# Patient Record
Sex: MALE | Race: BLACK OR AFRICAN AMERICAN | Employment: OTHER | URBAN - METROPOLITAN AREA
[De-identification: names, ages, dates, MRNs, and addresses within clinical notes are randomized per-mention and may not be internally consistent; named-entity substitution may affect disease eponyms.]

---

## 2018-04-30 ENCOUNTER — HOSPITAL ENCOUNTER (OUTPATIENT)
Age: 58
LOS: 1 days | Discharge: HOME | End: 2018-05-01
Attending: SPECIALIST | Admitting: SPECIALIST
Payer: OTHER GOVERNMENT

## 2018-04-30 ENCOUNTER — ANESTHESIA EVENT (OUTPATIENT)
Dept: SURGERY | Age: 58
End: 2018-04-30
Payer: OTHER GOVERNMENT

## 2018-04-30 ENCOUNTER — APPOINTMENT (OUTPATIENT)
Dept: GENERAL RADIOLOGY | Age: 58
End: 2018-04-30
Attending: SPECIALIST
Payer: OTHER GOVERNMENT

## 2018-04-30 ENCOUNTER — ANESTHESIA (OUTPATIENT)
Dept: SURGERY | Age: 58
End: 2018-04-30
Payer: OTHER GOVERNMENT

## 2018-04-30 DIAGNOSIS — M47.12 CERVICAL SPONDYLOSIS WITH MYELOPATHY: Primary | ICD-10-CM

## 2018-04-30 PROBLEM — Z98.1 S/P CERVICAL SPINAL FUSION: Status: ACTIVE | Noted: 2018-04-30

## 2018-04-30 LAB
ABO + RH BLD: NORMAL
BLOOD GROUP ANTIBODIES SERPL: NORMAL
SPECIMEN EXP DATE BLD: NORMAL

## 2018-04-30 PROCEDURE — 77030003666 HC NDL SPINAL BD -A: Performed by: SPECIALIST

## 2018-04-30 PROCEDURE — 86900 BLOOD TYPING SEROLOGIC ABO: CPT | Performed by: SPECIALIST

## 2018-04-30 PROCEDURE — 77030032490 HC SLV COMPR SCD KNE COVD -B: Performed by: SPECIALIST

## 2018-04-30 PROCEDURE — 77030020545: Performed by: SPECIALIST

## 2018-04-30 PROCEDURE — 77030013079 HC BLNKT BAIR HGGR 3M -A: Performed by: ANESTHESIOLOGY

## 2018-04-30 PROCEDURE — 77030002933 HC SUT MCRYL J&J -A: Performed by: SPECIALIST

## 2018-04-30 PROCEDURE — 97161 PT EVAL LOW COMPLEX 20 MIN: CPT

## 2018-04-30 PROCEDURE — 74011250636 HC RX REV CODE- 250/636

## 2018-04-30 PROCEDURE — 77030006643: Performed by: ANESTHESIOLOGY

## 2018-04-30 PROCEDURE — 77030014008 HC SPNG HEMSTAT J&J -C: Performed by: SPECIALIST

## 2018-04-30 PROCEDURE — 99218 HC RM OBSERVATION: CPT

## 2018-04-30 PROCEDURE — 76010000132 HC OR TIME 2.5 TO 3 HR: Performed by: SPECIALIST

## 2018-04-30 PROCEDURE — 77030027138 HC INCENT SPIROMETER -A

## 2018-04-30 PROCEDURE — 77030011640 HC PAD GRND REM COVD -A: Performed by: SPECIALIST

## 2018-04-30 PROCEDURE — 74011000272 HC RX REV CODE- 272: Performed by: SPECIALIST

## 2018-04-30 PROCEDURE — 77030002916 HC SUT ETHLN J&J -A: Performed by: SPECIALIST

## 2018-04-30 PROCEDURE — 77030011247 HC DRN WND KT TLS STRY -B: Performed by: SPECIALIST

## 2018-04-30 PROCEDURE — 77030004391 HC BUR FLUT MEDT -C: Performed by: SPECIALIST

## 2018-04-30 PROCEDURE — 77030003028 HC SUT VCRL J&J -A: Performed by: SPECIALIST

## 2018-04-30 PROCEDURE — 76060000036 HC ANESTHESIA 2.5 TO 3 HR: Performed by: SPECIALIST

## 2018-04-30 PROCEDURE — 74011250636 HC RX REV CODE- 250/636: Performed by: SPECIALIST

## 2018-04-30 PROCEDURE — C1713 ANCHOR/SCREW BN/BN,TIS/BN: HCPCS | Performed by: SPECIALIST

## 2018-04-30 PROCEDURE — 36415 COLL VENOUS BLD VENIPUNCTURE: CPT | Performed by: SPECIALIST

## 2018-04-30 PROCEDURE — 74011250637 HC RX REV CODE- 250/637: Performed by: SPECIALIST

## 2018-04-30 PROCEDURE — 74011000250 HC RX REV CODE- 250

## 2018-04-30 PROCEDURE — 74011000250 HC RX REV CODE- 250: Performed by: SPECIALIST

## 2018-04-30 PROCEDURE — 77030020782 HC GWN BAIR PAWS FLX 3M -B: Performed by: SPECIALIST

## 2018-04-30 PROCEDURE — 77030036596 HC SPCR CERV FORGE GLMB -G: Performed by: SPECIALIST

## 2018-04-30 PROCEDURE — 72020 X-RAY EXAM OF SPINE 1 VIEW: CPT

## 2018-04-30 PROCEDURE — 77030008477 HC STYL SATN SLP COVD -A: Performed by: ANESTHESIOLOGY

## 2018-04-30 PROCEDURE — 77030008683 HC TU ET CUF COVD -A: Performed by: ANESTHESIOLOGY

## 2018-04-30 PROCEDURE — 97116 GAIT TRAINING THERAPY: CPT

## 2018-04-30 PROCEDURE — 77030020407 HC IV BLD WRMR ST 3M -A: Performed by: ANESTHESIOLOGY

## 2018-04-30 PROCEDURE — 76210000006 HC OR PH I REC 0.5 TO 1 HR: Performed by: SPECIALIST

## 2018-04-30 PROCEDURE — 77030030163 HC BN WAX J&J -A: Performed by: SPECIALIST

## 2018-04-30 PROCEDURE — 77030018836 HC SOL IRR NACL ICUM -A: Performed by: SPECIALIST

## 2018-04-30 DEVICE — PROVIDENCE 4.2MM VARIABLE ANGLE SCREW, SELF-DRILLING, 16MM
Type: IMPLANTABLE DEVICE | Site: ANTERIOR CERVICAL | Status: FUNCTIONAL
Brand: PROVIDENCE

## 2018-04-30 DEVICE — IMPLANTABLE DEVICE: Type: IMPLANTABLE DEVICE | Site: ANTERIOR CERVICAL | Status: FUNCTIONAL

## 2018-04-30 DEVICE — PROVIDENCE 4.2MM VARIABLE ANGLE SCREW, SELF-DRILLING, 14MM
Type: IMPLANTABLE DEVICE | Site: ANTERIOR CERVICAL | Status: FUNCTIONAL
Brand: PROVIDENCE

## 2018-04-30 DEVICE — PROVIDENCE 4.6MM VARIABLE ANGLE SCREW, SELF-DRILLING, 14MM
Type: IMPLANTABLE DEVICE | Site: ANTERIOR CERVICAL | Status: FUNCTIONAL
Brand: PROVIDENCE

## 2018-04-30 DEVICE — PROVIDENCE ANTERIOR CERVICAL PLATE 2-LEVEL, 38MM
Type: IMPLANTABLE DEVICE | Site: ANTERIOR CERVICAL | Status: FUNCTIONAL
Brand: PROVIDENCE

## 2018-04-30 RX ORDER — DIPHENHYDRAMINE HCL 25 MG
25 CAPSULE ORAL
Status: DISCONTINUED | OUTPATIENT
Start: 2018-04-30 | End: 2018-05-01 | Stop reason: HOSPADM

## 2018-04-30 RX ORDER — HYDROMORPHONE HYDROCHLORIDE 2 MG/ML
INJECTION, SOLUTION INTRAMUSCULAR; INTRAVENOUS; SUBCUTANEOUS AS NEEDED
Status: DISCONTINUED | OUTPATIENT
Start: 2018-04-30 | End: 2018-04-30 | Stop reason: HOSPADM

## 2018-04-30 RX ORDER — MIDAZOLAM HYDROCHLORIDE 1 MG/ML
INJECTION, SOLUTION INTRAMUSCULAR; INTRAVENOUS AS NEEDED
Status: DISCONTINUED | OUTPATIENT
Start: 2018-04-30 | End: 2018-04-30 | Stop reason: HOSPADM

## 2018-04-30 RX ORDER — LIDOCAINE HYDROCHLORIDE 20 MG/ML
INJECTION, SOLUTION EPIDURAL; INFILTRATION; INTRACAUDAL; PERINEURAL AS NEEDED
Status: DISCONTINUED | OUTPATIENT
Start: 2018-04-30 | End: 2018-04-30 | Stop reason: HOSPADM

## 2018-04-30 RX ORDER — FENTANYL CITRATE 50 UG/ML
50 INJECTION, SOLUTION INTRAMUSCULAR; INTRAVENOUS
Status: DISCONTINUED | OUTPATIENT
Start: 2018-04-30 | End: 2018-04-30 | Stop reason: HOSPADM

## 2018-04-30 RX ORDER — FENTANYL CITRATE 50 UG/ML
INJECTION, SOLUTION INTRAMUSCULAR; INTRAVENOUS AS NEEDED
Status: DISCONTINUED | OUTPATIENT
Start: 2018-04-30 | End: 2018-04-30 | Stop reason: HOSPADM

## 2018-04-30 RX ORDER — DOCUSATE SODIUM 100 MG/1
100 CAPSULE, LIQUID FILLED ORAL 2 TIMES DAILY
Status: DISCONTINUED | OUTPATIENT
Start: 2018-04-30 | End: 2018-05-01 | Stop reason: HOSPADM

## 2018-04-30 RX ORDER — PROPOFOL 10 MG/ML
INJECTION, EMULSION INTRAVENOUS AS NEEDED
Status: DISCONTINUED | OUTPATIENT
Start: 2018-04-30 | End: 2018-04-30 | Stop reason: HOSPADM

## 2018-04-30 RX ORDER — SODIUM CHLORIDE 0.9 % (FLUSH) 0.9 %
5-10 SYRINGE (ML) INJECTION EVERY 8 HOURS
Status: DISCONTINUED | OUTPATIENT
Start: 2018-04-30 | End: 2018-05-01 | Stop reason: HOSPADM

## 2018-04-30 RX ORDER — SODIUM CHLORIDE, SODIUM LACTATE, POTASSIUM CHLORIDE, CALCIUM CHLORIDE 600; 310; 30; 20 MG/100ML; MG/100ML; MG/100ML; MG/100ML
75 INJECTION, SOLUTION INTRAVENOUS CONTINUOUS
Status: DISCONTINUED | OUTPATIENT
Start: 2018-04-30 | End: 2018-05-01 | Stop reason: HOSPADM

## 2018-04-30 RX ORDER — FLUMAZENIL 0.1 MG/ML
0.2 INJECTION INTRAVENOUS
Status: DISCONTINUED | OUTPATIENT
Start: 2018-04-30 | End: 2018-04-30 | Stop reason: HOSPADM

## 2018-04-30 RX ORDER — GLYCOPYRROLATE 0.2 MG/ML
INJECTION INTRAMUSCULAR; INTRAVENOUS AS NEEDED
Status: DISCONTINUED | OUTPATIENT
Start: 2018-04-30 | End: 2018-04-30 | Stop reason: HOSPADM

## 2018-04-30 RX ORDER — DEXAMETHASONE SODIUM PHOSPHATE 4 MG/ML
4 INJECTION, SOLUTION INTRA-ARTICULAR; INTRALESIONAL; INTRAMUSCULAR; INTRAVENOUS; SOFT TISSUE EVERY 8 HOURS
Status: COMPLETED | OUTPATIENT
Start: 2018-04-30 | End: 2018-05-01

## 2018-04-30 RX ORDER — ROCURONIUM BROMIDE 10 MG/ML
INJECTION, SOLUTION INTRAVENOUS AS NEEDED
Status: DISCONTINUED | OUTPATIENT
Start: 2018-04-30 | End: 2018-04-30 | Stop reason: HOSPADM

## 2018-04-30 RX ORDER — DEXAMETHASONE SODIUM PHOSPHATE 4 MG/ML
INJECTION, SOLUTION INTRA-ARTICULAR; INTRALESIONAL; INTRAMUSCULAR; INTRAVENOUS; SOFT TISSUE AS NEEDED
Status: DISCONTINUED | OUTPATIENT
Start: 2018-04-30 | End: 2018-04-30 | Stop reason: HOSPADM

## 2018-04-30 RX ORDER — PROPRANOLOL HYDROCHLORIDE 60 MG/1
120 CAPSULE, EXTENDED RELEASE ORAL DAILY
Status: DISCONTINUED | OUTPATIENT
Start: 2018-05-01 | End: 2018-05-01 | Stop reason: HOSPADM

## 2018-04-30 RX ORDER — ONDANSETRON 2 MG/ML
4 INJECTION INTRAMUSCULAR; INTRAVENOUS ONCE
Status: DISCONTINUED | OUTPATIENT
Start: 2018-04-30 | End: 2018-04-30 | Stop reason: HOSPADM

## 2018-04-30 RX ORDER — CEFAZOLIN SODIUM/WATER 2 G/20 ML
2 SYRINGE (ML) INTRAVENOUS EVERY 8 HOURS
Status: COMPLETED | OUTPATIENT
Start: 2018-04-30 | End: 2018-05-01

## 2018-04-30 RX ORDER — AMLODIPINE BESYLATE 2.5 MG/1
10 TABLET ORAL DAILY
Status: DISCONTINUED | OUTPATIENT
Start: 2018-05-01 | End: 2018-05-01 | Stop reason: HOSPADM

## 2018-04-30 RX ORDER — CEFAZOLIN SODIUM/WATER 2 G/20 ML
2 SYRINGE (ML) INTRAVENOUS ONCE
Status: COMPLETED | OUTPATIENT
Start: 2018-04-30 | End: 2018-04-30

## 2018-04-30 RX ORDER — SODIUM CHLORIDE, SODIUM LACTATE, POTASSIUM CHLORIDE, CALCIUM CHLORIDE 600; 310; 30; 20 MG/100ML; MG/100ML; MG/100ML; MG/100ML
125 INJECTION, SOLUTION INTRAVENOUS CONTINUOUS
Status: DISCONTINUED | OUTPATIENT
Start: 2018-04-30 | End: 2018-05-01 | Stop reason: HOSPADM

## 2018-04-30 RX ORDER — ONDANSETRON 2 MG/ML
INJECTION INTRAMUSCULAR; INTRAVENOUS AS NEEDED
Status: DISCONTINUED | OUTPATIENT
Start: 2018-04-30 | End: 2018-04-30 | Stop reason: HOSPADM

## 2018-04-30 RX ORDER — SODIUM CHLORIDE, SODIUM LACTATE, POTASSIUM CHLORIDE, CALCIUM CHLORIDE 600; 310; 30; 20 MG/100ML; MG/100ML; MG/100ML; MG/100ML
100 INJECTION, SOLUTION INTRAVENOUS CONTINUOUS
Status: DISCONTINUED | OUTPATIENT
Start: 2018-04-30 | End: 2018-04-30 | Stop reason: HOSPADM

## 2018-04-30 RX ORDER — NALOXONE HYDROCHLORIDE 0.4 MG/ML
0.4 INJECTION, SOLUTION INTRAMUSCULAR; INTRAVENOUS; SUBCUTANEOUS AS NEEDED
Status: DISCONTINUED | OUTPATIENT
Start: 2018-04-30 | End: 2018-04-30 | Stop reason: HOSPADM

## 2018-04-30 RX ORDER — HYDROMORPHONE HYDROCHLORIDE 2 MG/ML
0.5 INJECTION, SOLUTION INTRAMUSCULAR; INTRAVENOUS; SUBCUTANEOUS
Status: DISCONTINUED | OUTPATIENT
Start: 2018-04-30 | End: 2018-04-30 | Stop reason: HOSPADM

## 2018-04-30 RX ORDER — NALOXONE HYDROCHLORIDE 0.4 MG/ML
0.4 INJECTION, SOLUTION INTRAMUSCULAR; INTRAVENOUS; SUBCUTANEOUS AS NEEDED
Status: DISCONTINUED | OUTPATIENT
Start: 2018-04-30 | End: 2018-05-01 | Stop reason: HOSPADM

## 2018-04-30 RX ORDER — SODIUM CHLORIDE 0.9 % (FLUSH) 0.9 %
5-10 SYRINGE (ML) INJECTION AS NEEDED
Status: DISCONTINUED | OUTPATIENT
Start: 2018-04-30 | End: 2018-05-01 | Stop reason: HOSPADM

## 2018-04-30 RX ORDER — ONDANSETRON 2 MG/ML
4 INJECTION INTRAMUSCULAR; INTRAVENOUS
Status: DISCONTINUED | OUTPATIENT
Start: 2018-04-30 | End: 2018-05-01 | Stop reason: HOSPADM

## 2018-04-30 RX ORDER — OXYCODONE AND ACETAMINOPHEN 5; 325 MG/1; MG/1
1 TABLET ORAL
Status: DISCONTINUED | OUTPATIENT
Start: 2018-04-30 | End: 2018-05-01 | Stop reason: HOSPADM

## 2018-04-30 RX ORDER — LORATADINE 10 MG/1
10 TABLET ORAL DAILY
Status: DISCONTINUED | OUTPATIENT
Start: 2018-05-01 | End: 2018-05-01 | Stop reason: HOSPADM

## 2018-04-30 RX ADMIN — Medication 2 G: at 17:15

## 2018-04-30 RX ADMIN — DEXAMETHASONE SODIUM PHOSPHATE 8 MG: 4 INJECTION, SOLUTION INTRA-ARTICULAR; INTRALESIONAL; INTRAMUSCULAR; INTRAVENOUS; SOFT TISSUE at 10:38

## 2018-04-30 RX ADMIN — SODIUM CHLORIDE, SODIUM LACTATE, POTASSIUM CHLORIDE, AND CALCIUM CHLORIDE 125 ML/HR: 600; 310; 30; 20 INJECTION, SOLUTION INTRAVENOUS at 15:22

## 2018-04-30 RX ADMIN — DOCUSATE SODIUM 100 MG: 100 CAPSULE, LIQUID FILLED ORAL at 22:34

## 2018-04-30 RX ADMIN — LIDOCAINE HYDROCHLORIDE 40 MG: 20 INJECTION, SOLUTION EPIDURAL; INFILTRATION; INTRACAUDAL; PERINEURAL at 10:26

## 2018-04-30 RX ADMIN — SODIUM CHLORIDE, SODIUM LACTATE, POTASSIUM CHLORIDE, AND CALCIUM CHLORIDE: 600; 310; 30; 20 INJECTION, SOLUTION INTRAVENOUS at 12:15

## 2018-04-30 RX ADMIN — ROCURONIUM BROMIDE 10 MG: 10 INJECTION, SOLUTION INTRAVENOUS at 11:08

## 2018-04-30 RX ADMIN — Medication 2 G: at 10:37

## 2018-04-30 RX ADMIN — MIDAZOLAM HYDROCHLORIDE 2 MG: 1 INJECTION, SOLUTION INTRAMUSCULAR; INTRAVENOUS at 10:18

## 2018-04-30 RX ADMIN — ONDANSETRON 4 MG: 2 INJECTION INTRAMUSCULAR; INTRAVENOUS at 10:41

## 2018-04-30 RX ADMIN — SODIUM CHLORIDE, SODIUM LACTATE, POTASSIUM CHLORIDE, AND CALCIUM CHLORIDE 75 ML/HR: 600; 310; 30; 20 INJECTION, SOLUTION INTRAVENOUS at 18:17

## 2018-04-30 RX ADMIN — HYDROMORPHONE HYDROCHLORIDE 1 MG: 2 INJECTION, SOLUTION INTRAMUSCULAR; INTRAVENOUS; SUBCUTANEOUS at 12:45

## 2018-04-30 RX ADMIN — HYDROMORPHONE HYDROCHLORIDE 1 MG: 2 INJECTION, SOLUTION INTRAMUSCULAR; INTRAVENOUS; SUBCUTANEOUS at 11:06

## 2018-04-30 RX ADMIN — ROCURONIUM BROMIDE 50 MG: 10 INJECTION, SOLUTION INTRAVENOUS at 10:26

## 2018-04-30 RX ADMIN — SODIUM CHLORIDE, SODIUM LACTATE, POTASSIUM CHLORIDE, AND CALCIUM CHLORIDE 125 ML/HR: 600; 310; 30; 20 INJECTION, SOLUTION INTRAVENOUS at 13:50

## 2018-04-30 RX ADMIN — DEXAMETHASONE SODIUM PHOSPHATE 4 MG: 4 INJECTION, SOLUTION INTRAMUSCULAR; INTRAVENOUS at 18:10

## 2018-04-30 RX ADMIN — SODIUM CHLORIDE, SODIUM LACTATE, POTASSIUM CHLORIDE, AND CALCIUM CHLORIDE: 600; 310; 30; 20 INJECTION, SOLUTION INTRAVENOUS at 10:45

## 2018-04-30 RX ADMIN — PROPOFOL 200 MG: 10 INJECTION, EMULSION INTRAVENOUS at 10:26

## 2018-04-30 RX ADMIN — FENTANYL CITRATE 100 MCG: 50 INJECTION, SOLUTION INTRAMUSCULAR; INTRAVENOUS at 10:18

## 2018-04-30 RX ADMIN — SODIUM CHLORIDE, SODIUM LACTATE, POTASSIUM CHLORIDE, AND CALCIUM CHLORIDE 125 ML/HR: 600; 310; 30; 20 INJECTION, SOLUTION INTRAVENOUS at 09:07

## 2018-04-30 RX ADMIN — GLYCOPYRROLATE 0.2 MG: 0.2 INJECTION INTRAMUSCULAR; INTRAVENOUS at 10:24

## 2018-04-30 RX ADMIN — Medication: at 13:31

## 2018-04-30 NOTE — PROGRESS NOTES
Problem: Falls - Risk of  Goal: *Absence of Falls  Document Ranjith Fall Risk and appropriate interventions in the flowsheet.    Outcome: Progressing Towards Goal  Fall Risk Interventions:

## 2018-04-30 NOTE — ROUTINE PROCESS
TRANSFER - IN REPORT:    Verbal report received from [address] on Isauro Aguero  being received from PACU for routine post - op. Report consisted of patients Situation, Background, Assessment and   Recommendations(SBAR). Information from the following report(s) SBAR, Kardex, OR Summary, Intake/Output and MAR was reviewed with the receiving nurse. Opportunity for questions and clarification was provided. Assessment to be completed upon patients arrival to unit and care assumed.

## 2018-04-30 NOTE — IP AVS SNAPSHOT
[address] [address] 
[phone] Patient: Ava Coronel MRN: [id number] A check ari indicates which time of day the medication should be taken. My Medications START taking these medications Instructions Each Dose to Equal  
 Morning Noon Evening Bedtime  
 oxyCODONE-acetaminophen 5-325 mg per tablet Commonly known as:  PERCOCET Your last dose was: Your next dose is: Take 1 Tab by mouth every four (4) hours as needed for Pain. Max Daily Amount: 6 Tabs. 1 Tab ASK your doctor about these medications Instructions Each Dose to Equal  
 Morning Noon Evening Bedtime  
 amLODIPine 10 mg tablet Commonly known as:  Erenest Hansel Your last dose was: Your next dose is: Take 10 mg by mouth daily. 10 mg  
    
   
   
   
  
 cetirizine 10 mg tablet Commonly known as:  ZYRTEC Your last dose was: Your next dose is: Take  by mouth daily. Indications: Allergic Rhinitis MEGARED OMEGA-3 KRILL OIL 1,000-230-60 mg Cap Generic drug:  krill-om-3-dha-epa-phospho-ast  
   
Your last dose was: Your next dose is: Take  by mouth daily. MOTRIN PO Your last dose was: Your next dose is: Take 400 mg by mouth as needed. 400 mg  
    
   
   
   
  
 propranolol  mg SR capsule Commonly known as:  INDERAL LA Your last dose was: Your next dose is: Take 120 mg by mouth daily. Indications: hypertension 120 mg  
    
   
   
   
  
 VITAMIN D3 1,000 unit tablet Generic drug:  cholecalciferol Your last dose was: Your next dose is: Take  by mouth daily. Where to Get Your Medications Information on where to get these meds will be given to you by the nurse or doctor. ! Ask your nurse or doctor about these medications  
  oxyCODONE-acetaminophen 5-325 mg per tablet

## 2018-04-30 NOTE — IP AVS SNAPSHOT
303 [facility] 
 
 
 [address] [place] [address] 
[phone] Patient: Laila Babcock MRN: [id number] About your hospitalization You were admitted on:  April 30, 2018 You last received care in the:  THE [facility] [address] You were discharged on:  May 1, 2018 Why you were hospitalized Your primary diagnosis was:  Not on File Your diagnoses also included:  Cervical Spondylosis With Myelopathy, S/P Cervical Spinal Fusion, Pain At Surgical Incision Follow-up Information Follow up With Details Comments Contact Info Nirav Marie MD On 5/24/2018 Follow up appointment @ 10:00am [address] [facility] 
[phone] Ruth Velez MD   [place] MANJIT RICHEY [address] [place] [address] 
[phone] Discharge Orders None A check ari indicates which time of day the medication should be taken. My Medications START taking these medications Instructions Each Dose to Equal  
 Morning Noon Evening Bedtime  
 oxyCODONE-acetaminophen 5-325 mg per tablet Commonly known as:  PERCOCET Your last dose was: Your next dose is: Take 1 Tab by mouth every four (4) hours as needed for Pain. Max Daily Amount: 6 Tabs. 1 Tab ASK your doctor about these medications Instructions Each Dose to Equal  
 Morning Noon Evening Bedtime  
 amLODIPine 10 mg tablet Commonly known as:  Mary Sagar Your last dose was: Your next dose is: Take 10 mg by mouth daily. 10 mg  
    
   
   
   
  
 cetirizine 10 mg tablet Commonly known as:  ZYRTEC Your last dose was: Your next dose is: Take  by mouth daily. Indications: Allergic Rhinitis MEGARED OMEGA-3 KRILL OIL 1,000-230-60 mg Cap Generic drug:  krill-om-3-dha-epa-phospho-ast  
   
Your last dose was: Your next dose is: Take  by mouth daily. MOTRIN PO Your last dose was: Your next dose is: Take 400 mg by mouth as needed. 400 mg  
    
   
   
   
  
 propranolol  mg SR capsule Commonly known as:  INDERAL LA Your last dose was: Your next dose is: Take 120 mg by mouth daily. Indications: hypertension 120 mg  
    
   
   
   
  
 VITAMIN D3 1,000 unit tablet Generic drug:  cholecalciferol Your last dose was: Your next dose is: Take  by mouth daily. Where to Get Your Medications Information on where to get these meds will be given to you by the nurse or doctor. ! Ask your nurse or doctor about these medications  
  oxyCODONE-acetaminophen 5-325 mg per tablet Opioid Education Prescription Opioids: What You Need to Know: 
 
 
 
F-face looks uneven A-arms unable to move or move unevenly S-speech slurred or non-existent T-time-call 911 as soon as signs and symptoms begin-DO NOT go Back to bed or wait to see if you get better-TIME IS BRAIN. Warning Signs of HEART ATTACK Call 911 if you have these symptoms: 
? Chest discomfort. Most heart attacks involve discomfort in the center of the chest that lasts more than a few minutes, or that goes away and comes back. It can feel like uncomfortable pressure, squeezing, fullness, or pain. ? Discomfort in other areas of the upper body. Symptoms can include pain or discomfort in one or both arms, the back, neck, jaw, or stomach. ? Shortness of breath with or without chest discomfort. ? Other signs may include breaking out in a cold sweat, nausea, or lightheadedness. Don't wait more than five minutes to call [address]! Fast action can save your life. Calling 911 is almost always the fastest way to get lifesaving treatment. Emergency Medical Services staff can begin treatment when they arrive  up to an hour sooner than if someone gets to the hospital by car. The discharge information has been reviewed with the patient. The patient verbalized understanding. Discharge medications reviewed with the patient and appropriate educational materials and side effects teaching were provided. ___________________________________________________________________________________________________________________________________ Lipperheyhart Announcement We are excited to announce that we are making your provider's discharge notes available to you in Appetizer Mobile. You will see these notes when they are completed and signed by the physician that discharged you from your recent hospital stay. If you have any questions or concerns about any information you see in RumbleTalkt, please call the Health Information Department where you were seen or reach out to your Primary Care Provider for more information about your plan of care. Introducing [facility] & HEALTH SERVICES! [employer] introduces Appetizer Mobile patient portal. Now you can access parts of your medical record, email your doctor's office, and request medication refills online. 1. In your internet browser, go to [email or web address] 2. Click on the First Time User? Click Here link in the Sign In box. You will see the New Member Sign Up page. 3. Enter your Appetizer Mobile Access Code exactly as it appears below.  You will not need to use this code after youve completed the sign-up process. If you do not sign up before the expiration date, you must request a new code. · LoopMe Access Code: [email or web address] Expires: 7/23/2018 11:11 AM 
 
4. Enter the last four digits of your Social Security Number (xxxx) and Date of Birth (mm/dd/yyyy) as indicated and click Submit. You will be taken to the next sign-up page. 5. Create a LoopMe ID. This will be your LoopMe login ID and cannot be changed, so think of one that is secure and easy to remember. 6. Create a LoopMe password. You can change your password at any time. 7. Enter your Password Reset Question and Answer. This can be used at a later time if you forget your password. 8. Enter your e-mail address. You will receive e-mail notification when new information is available in [address] 9. Click Sign Up. You can now view and download portions of your medical record. 10. Click the Download Summary menu link to download a portable copy of your medical information. If you have questions, please visit the Frequently Asked Questions section of the LoopMe website. Remember, LoopMe is NOT to be used for urgent needs. For medical emergencies, dial 911. Now available from your iPhone and Android! Introducing Familia Reyes As a [facility] patient, I wanted to make you aware of our electronic visit tool called Familia Masonliudmilaruss. Dimitri [facility] 24/7 allows you to connect within minutes with a medical provider 24 hours a day, seven days a week via a mobile device or tablet or logging into a secure website from your computer. You can access Familia Reyes from anywhere in the [place].  
 
A virtual visit might be right for you when you have a simple condition and feel like you just dont want to get out of bed, or cant get away from work for an appointment, when your regular [facility] provider is not available (evenings, weekends or holidays), or when youre out of town and need minor care. Electronic visits cost only $49 and if the [employer] 24/7 provider determines a prescription is needed to treat your condition, one can be electronically transmitted to a nearby pharmacy*. Please take a moment to enroll today if you have not already done so. The enrollment process is free and takes just a few minutes. To enroll, please download the [employer] 24/7 matilda to your tablet or phone, or visit [email or web address] to enroll on your computer. And, as an [address] [place] [address] patient with a Near Page account, the results of your visits will be scanned into your electronic medical record and your primary care provider will be able to view the scanned results. We urge you to continue to see your regular [employer] provider for your ongoing medical care. And while your primary care provider may not be the one available when you seek a Wandrian virtual visit, the peace of mind you get from getting a real diagnosis real time can be priceless. For more information on Wandrian, view our Frequently Asked Questions (FAQs) at [email or web address]. Sincerely, 
 
Alice Macedo MD 
Chief Medical Officer [employer] *:  certain medications cannot be prescribed via Wandrian Unresulted Labs-Please follow up with your PCP about these lab tests Order Current Status [place] XR TECHNOLOGIST SERVICE In process Providers Seen During Your Hospitalization Provider Specialty Primary office phone Leeroy Ahuja MD Neurosurgery [phone] Your Primary Care Physician (PCP) Primary Care Physician Office Phone Office Fax Fay Hall [address] You are allergic to the following Allergen Reactions Gabapentin Swelling  
 feet Recent Documentation Height Weight BMI Smoking Status 1.727 m 74.2 kg 24.87 kg/m2 Former Smoker Emergency Contacts Name Discharge Info Relation Home Work Mobile Urmila Banks DISCHARGE CAREGIVER [3] Girlfriend [18]   [phone] Patient Belongings The following personal items are in your possession at time of discharge: 
     Visual Aid: Glasses, At bedside      Home Medications: None   Jewelry: None  Clothing: Footwear, Pants, Shirt, Socks, Undergarments (with spouse Minerva Duenas )    Other Valuables: Cell Phone, Eyeglasses (with wife ) Please provide this summary of care documentation to your next provider. Signatures-by signing, you are acknowledging that this After Visit Summary has been reviewed with you and you have received a copy. Patient Signature:  ____________________________________________________________ Date:  ____________________________________________________________  
  
Basilia Barbosa Provider Signature:  ____________________________________________________________ Date:  ____________________________________________________________

## 2018-04-30 NOTE — PERIOP NOTES
Verbal hand off at the bedside with [facility] provided opportunity for questions and dual skin assessment completed.

## 2018-04-30 NOTE — ANESTHESIA POSTPROCEDURE EVALUATION
Post-Anesthesia Evaluation and Assessment    Cardiovascular Function/Vital Signs  Visit Vitals    /74    Pulse (!) 54    Temp 36.7 °C (98 °F)    Resp 15    Ht 5' 8\" (1.727 m)    Wt 74.2 kg (163 lb 9 oz)    SpO2 100%    BMI 24.87 kg/m2       Patient is status post Procedure(s):  ANTERIOR CERVICAL DISKECTOMY AND FUSION (C3-C4,C4-C5) BONE AND PLATING W/C-ARM. Nausea/Vomiting: Controlled. Postoperative hydration reviewed and adequate. Pain:  Pain Scale 1: Numeric (0 - 10) (04/30/18 1345)  Pain Intensity 1: 0 (04/30/18 1345)   Managed. Neurological Status:   Neuro (WDL): Within Defined Limits (04/30/18 1304)   At baseline. Mental Status and Level of Consciousness: Baseline and stable. Pulmonary Status:   O2 Device: Nasal cannula (04/30/18 1320)   Adequate oxygenation and airway patent. Complications related to anesthesia: None    Post-anesthesia assessment completed. No concerns. Patient has met all discharge requirements.     Signed By: Stephon Estrada MD

## 2018-04-30 NOTE — PROGRESS NOTES
Problem: Mobility Impaired (Adult and Pediatric)  Goal: *Acute Goals and Plan of Care (Insert Text)  In 1-7 days pt will be able to perform:  STG  1. Bed mobility:  Demonstrate proper log-roll technique for safe initiation of rolling for OOB activities. 2.  Supine to sit to supine S with HR for meals. 3.  Sit to stand to sit S with cervical collar in prep for ambulation. LT.  Gait:  Ambulate 150ft S with cervical collar, for home/community mobility. 2.  Stair Negotiation:  Ascend/descend >2 steps CGA with HR for home entry. 3.  Activity tolerance: Tolerate up in chair 30 minutes-1 hour for ADLs. 4.  Patient/Family Education:  Patient/family to be independent with HEP for follow-up care and safe discharge. physical Therapy EVALUATION    Patient: Patience Miguel (89 y.o. male)  Date: 2018  Primary Diagnosis: CERVICAL SPONDYLOSIS  Cervical spondylosis with myelopathy  S/P cervical spinal fusion  Procedure(s) (LRB):  ANTERIOR CERVICAL DISKECTOMY AND FUSION (C3-C4,C4-C5) BONE AND PLATING W/C-ARM (N/A) Day of Surgery   Precautions:   Fall, Spinal    ASSESSMENT :  Based on the objective data described below, the patient presents with decreased functional mobility and independence in regard to bed mobility, transfers, gt quality and tolerance, pain, stair negotiation and safety due to recent cervical surgery. Pt rating pain in cervical region 6/10 on numerical pain scale. Pt instructed in cervical precautions and log roll technique. Pt able to participate in gt training w/ cervical collar, GB and SBA in hallway holding to IV pole. Pt returned to supine in bed w/ all needs within reach, SCDs applied. Nurse Lawanna Kanner aware and visitor present. Recommend [facility] upon hospital d/c. Patient will benefit from skilled intervention to address the above impairments.   Patients rehabilitation potential is considered to be Excellent  Factors which may influence rehabilitation potential include:   []         None noted  []         Mental ability/status  [x]         Medical condition  []         Home/family situation and support systems  []         Safety awareness  [x]         Pain tolerance/management  []         Other:      PLAN :  Recommendations and Planned Interventions:  [x]           Bed Mobility Training             []    Neuromuscular Re-Education  [x]           Transfer Training                   []    Orthotic/Prosthetic Training  [x]           Gait Training                          []    Modalities  []           Therapeutic Exercises          []    Edema Management/Control  [x]           Therapeutic Activities            [x]    Patient and Family Training/Education  []           Other (comment):    Frequency/Duration: Patient will be followed by physical therapy twice daily to address goals. Discharge Recommendations: Home Health  Further Equipment Recommendations for Discharge: N/A     SUBJECTIVE:   Patient stated I feel good enough to walk.     OBJECTIVE DATA SUMMARY:     Past Medical History:   Diagnosis Date    Allergic rhinitis     Arthritis     Hypertension 2011   History reviewed. No pertinent surgical history. Barriers to Learning/Limitations: None  Compensate with: visual, verbal, tactile, kinesthetic cues/model  Prior Level of Function/Home Situation:   Home Situation  Home Environment: Private residence  # Steps to Enter: 3  Rails to Enter: Yes  Hand Rails : Bilateral  One/Two Story Residence: One story  Living Alone: No  Support Systems: Spouse/Significant Other/Partner  Patient Expects to be Discharged to[de-identified] Private residence  Current DME Used/Available at Home: Brace/Splint  Critical Behavior:  Neurologic State: Alert; Appropriate for age  Orientation Level: Oriented X4  Cognition: Appropriate decision making; Appropriate for age attention/concentration; Appropriate safety awareness; Follows commands  Safety/Judgement: Awareness of environment  Psychosocial  Patient Behaviors: Calm;Cooperative  Purposeful Interaction: Yes  Pt Identified Daily Priority: Clinical issues (comment)  Caritas Process: Nurture loving kindness;Establish trust;Teaching/learning;Create healing environment  Caring Interventions: Reassure  Reassure: Therapeutic listening; Acceptance;Caring rounds  Skin Condition/Temp: Dry;Warm  Skin Integrity: Incision (comment) (cervical)  Skin Integumentary  Skin Color: Appropriate for ethnicity  Skin Condition/Temp: Dry;Warm  Skin Integrity: Incision (comment) (cervical)  Turgor: Non-tenting  Hair Growth: Present  Varicosities: Absent  Strength:    Strength: Generally decreased, functional  Tone & Sensation:   Tone: Normal  Sensation: Intact  Range Of Motion:  AROM: Generally decreased, functional  Functional Mobility:  Bed Mobility:  Rolling: Supervision  Supine to Sit: Supervision  Sit to Supine: Supervision  Scooting: Supervision  Transfers:  Sit to Stand: Supervision  Stand to Sit: Supervision  Balance:   Sitting: Intact  Standing: Intact  Ambulation/Gait Training:  Distance (ft): 150 Feet (ft)  Assistive Device: Gait belt;Brace/Splint  Ambulation - Level of Assistance: Stand-by assistance (vc)  Gait Abnormalities: Path deviations  Base of Support: Narrowed  Stance: Weight shift  Speed/Amairani: Pace decreased (<100 feet/min)  Step Length: Left shortened;Right shortened  Interventions: Safety awareness training; Tactile cues; Verbal cues  Therapeutic Exercises:   Pain:  Pain Scale 1: Numeric (0 - 10)  Pain Intensity 1: 6  Pain Location 1: Neck  Pain Orientation 1: Posterior  Pain Description 1: Tightness  Pain Intervention(s) 1: Encouraged PCA  Activity Tolerance:   Good   Please refer to the flowsheet for vital signs taken during this treatment.   After treatment:   []         Patient left in no apparent distress sitting up in chair  [x]         Patient left in no apparent distress in bed  [x]         Call bell left within reach  [x]         Nursing notified  [x]         Caregiver present  []         Bed alarm activated    COMMUNICATION/EDUCATION:   [x]         Fall prevention education was provided and the patient/caregiver indicated understanding. [x]         Patient/family have participated as able in goal setting and plan of care. [x]         Patient/family agree to work toward stated goals and plan of care. []         Patient understands intent and goals of therapy, but is neutral about his/her participation. []         Patient is unable to participate in goal setting and plan of care. Thank you for this referral.  Aníbal Son, PT   Time Calculation: 23 mins    Eval Complexity: History: MEDIUM  Complexity : 1-2 comorbidities / personal factors will impact the outcome/ POC Exam:MEDIUM Complexity : 3 Standardized tests and measures addressing body structure, function, activity limitation and / or participation in recreation  Presentation: LOW Complexity : Stable, uncomplicated  Clinical Decision Making:Low Complexity amb >30' Overall Complexity:LOW      Mobility  Current  CI= 1-19%   Goal  CI= 1-19%. The severity rating is based on the Level of Assistance required for Functional Mobility and ADLs.

## 2018-04-30 NOTE — ANESTHESIA PREPROCEDURE EVALUATION
Anesthetic History   No history of anesthetic complications            Review of Systems / Medical History  Patient summary reviewed, nursing notes reviewed and pertinent labs reviewed    Pulmonary              Pertinent negatives: No COPD, asthma, recent URI and sleep apnea  Comments: Former smoker   Neuro/Psych   Within defined limits           Cardiovascular    Hypertension: well controlled            Pertinent negatives: No past MI, CAD, PAD, dysrhythmias, angina and CHF  Exercise tolerance: >4 METS     GI/Hepatic/Renal  Within defined limits              Endo/Other        Arthritis  Pertinent negatives: No diabetes, hypothyroidism, hyperthyroidism, obesity and blood dyscrasia   Other Findings              Physical Exam    Airway  Mallampati: II  TM Distance: 4 - 6 cm  Neck ROM: decreased range of motion   Mouth opening: Normal     Cardiovascular  Regular rate and rhythm,  S1 and S2 normal,  no murmur, click, rub, or gallop             Dental  No notable dental hx       Pulmonary  Breath sounds clear to auscultation               Abdominal  GI exam deferred       Other Findings            Anesthetic Plan    ASA: 2  Anesthesia type: general          Induction: Intravenous  Anesthetic plan and risks discussed with: Patient and Family      GA/OETT with Martinez Gillespie

## 2018-04-30 NOTE — PERIOP NOTES
TRANSFER - OUT REPORT:    Verbal report given to Tam Donnelly RN(name) on Rk Christie  being transferred to [address] [place], [place] [address]unit) for routine progression of care       Report consisted of patients Situation, Background, Assessment and   Recommendations(SBAR). Information from the following report(s) SBAR, Kardex, OR Summary, Procedure Summary, MAR and Recent Results was reviewed with the receiving nurse. Lines:   Peripheral IV 04/30/18 Left Antecubital (Active)   Site Assessment Clean, dry, & intact 4/30/2018  1:04 PM   Phlebitis Assessment 0 4/30/2018  1:04 PM   Infiltration Assessment 0 4/30/2018  1:04 PM   Dressing Status Clean, dry, & intact 4/30/2018  1:04 PM   Dressing Type Tape;Transparent 4/30/2018  1:04 PM   Hub Color/Line Status Green; Infusing 4/30/2018  1:04 PM        Opportunity for questions and clarification was provided.       Patient transported with:   O2 @ 2 liters  Registered Nurse  [facility]

## 2018-05-01 VITALS
BODY MASS INDEX: 24.79 KG/M2 | HEIGHT: 68 IN | HEART RATE: 57 BPM | WEIGHT: 163.56 LBS | TEMPERATURE: 97.4 F | RESPIRATION RATE: 16 BRPM | OXYGEN SATURATION: 100 % | SYSTOLIC BLOOD PRESSURE: 142 MMHG | DIASTOLIC BLOOD PRESSURE: 75 MMHG

## 2018-05-01 PROBLEM — L76.82 PAIN AT SURGICAL INCISION: Status: ACTIVE | Noted: 2018-05-01

## 2018-05-01 PROCEDURE — 74011250636 HC RX REV CODE- 250/636: Performed by: SPECIALIST

## 2018-05-01 PROCEDURE — 74011250637 HC RX REV CODE- 250/637: Performed by: SPECIALIST

## 2018-05-01 PROCEDURE — 97116 GAIT TRAINING THERAPY: CPT

## 2018-05-01 RX ORDER — OXYCODONE AND ACETAMINOPHEN 5; 325 MG/1; MG/1
1 TABLET ORAL
Qty: 30 TAB | Refills: 0 | Status: SHIPPED | OUTPATIENT
Start: 2018-05-01

## 2018-05-01 RX ADMIN — LORATADINE 10 MG: 10 TABLET ORAL at 08:17

## 2018-05-01 RX ADMIN — PROPRANOLOL HYDROCHLORIDE 120 MG: 60 CAPSULE, EXTENDED RELEASE ORAL at 08:18

## 2018-05-01 RX ADMIN — Medication 10 ML: at 08:31

## 2018-05-01 RX ADMIN — Medication 2 G: at 08:17

## 2018-05-01 RX ADMIN — DEXAMETHASONE SODIUM PHOSPHATE 4 MG: 4 INJECTION, SOLUTION INTRAMUSCULAR; INTRAVENOUS at 00:03

## 2018-05-01 RX ADMIN — DOCUSATE SODIUM 100 MG: 100 CAPSULE, LIQUID FILLED ORAL at 08:17

## 2018-05-01 RX ADMIN — DEXAMETHASONE SODIUM PHOSPHATE 4 MG: 4 INJECTION, SOLUTION INTRAMUSCULAR; INTRAVENOUS at 08:17

## 2018-05-01 RX ADMIN — Medication 2 G: at 00:03

## 2018-05-01 NOTE — DISCHARGE INSTRUCTIONS
DISCHARGE SUMMARY from Nurse    PATIENT INSTRUCTIONS:    After general anesthesia or intravenous sedation, for 24 hours or while taking prescription Narcotics:  · Limit your activities  · Do not drive and operate hazardous machinery  · Do not make important personal or business decisions  · Do  not drink alcoholic beverages  · If you have not urinated within 8 hours after discharge, please contact your surgeon on call. Report the following to your surgeon:  · Excessive pain, swelling, redness or odor of or around the surgical area  · Temperature over 100.5  · Nausea and vomiting lasting longer than 4 hours or if unable to take medications  · Any signs of decreased circulation or nerve impairment to extremity: change in color, persistent  numbness, tingling, coldness or increase pain  · Any questions    What to do at Home:  Recommended activity: Activity as tolerated and No lifting, Driving, or Strenuous exercise until cleared by surgeon    Do not get dressing wet; may sponge bathe. May change dressing as needed. *  Please give a list of your current medications to your Primary Care Provider. *  Please update this list whenever your medications are discontinued, doses are      changed, or new medications (including over-the-counter products) are added. *  Please carry medication information at all times in case of emergency situations. These are general instructions for a healthy lifestyle:    No smoking/ No tobacco products/ Avoid exposure to second hand smoke  Surgeon General's Warning:  Quitting smoking now greatly reduces serious risk to your health.     Obesity, smoking, and sedentary lifestyle greatly increases your risk for illness    A healthy diet, regular physical exercise & weight monitoring are important for maintaining a healthy lifestyle    You may be retaining fluid if you have a history of heart failure or if you experience any of the following symptoms:  Weight gain of 3 pounds or more overnight or 5 pounds in a week, increased swelling in our hands or feet or shortness of breath while lying flat in bed. Please call your doctor as soon as you notice any of these symptoms; do not wait until your next office visit. Recognize signs and symptoms of STROKE:    F-face looks uneven    A-arms unable to move or move unevenly    S-speech slurred or non-existent    T-time-call 911 as soon as signs and symptoms begin-DO NOT go       Back to bed or wait to see if you get better-TIME IS BRAIN. Warning Signs of HEART ATTACK     Call 911 if you have these symptoms:   Chest discomfort. Most heart attacks involve discomfort in the center of the chest that lasts more than a few minutes, or that goes away and comes back. It can feel like uncomfortable pressure, squeezing, fullness, or pain.  Discomfort in other areas of the upper body. Symptoms can include pain or discomfort in one or both arms, the back, neck, jaw, or stomach.  Shortness of breath with or without chest discomfort.  Other signs may include breaking out in a cold sweat, nausea, or lightheadedness. Don't wait more than five minutes to call 911 - MINUTES MATTER! Fast action can save your life. Calling 911 is almost always the fastest way to get lifesaving treatment. Emergency Medical Services staff can begin treatment when they arrive -- up to an hour sooner than if someone gets to the hospital by car. The discharge information has been reviewed with the patient. The patient verbalized understanding. Discharge medications reviewed with the patient and appropriate educational materials and side effects teaching were provided.   ___________________________________________________________________________________________________________________________________

## 2018-05-01 NOTE — DISCHARGE SUMMARY
Discharge Summary     PATIENT ID:  Andrew Cisneros  56 y.o.  1960    PHYSICIAN:  Handy Coy MD, MD .  ADMIT DATE: 4/30/2018   DISCHARGE DATE:  5-1-18      DISCHARGE DIAGNOSIS:  Cervical spondylosis with myelopathy    OPERATIVE PROCEDURES:  Anterior cervical discectomy with fusion C3-4 C4-5        HOSPITAL COURSE:  Tolerated the operative procedure well and was taken to PACU and then to the floor. As of d/c the patient is ambulating, tolerating p.o., and voiding without difficulty. PHYSICAL EXAM:  Visit Vitals    /75 (BP 1 Location: Right arm, BP Patient Position: At rest;Sitting)    Pulse (!) 57    Temp 97.4 °F (36.3 °C)    Resp 16    Ht 5' 8\" (1.727 m)    Wt 74.2 kg (163 lb 9 oz)    SpO2 100%    BMI 24.87 kg/m2     Alert and appropriate. Motor and sensory function are grossly intact. The wound is clean/dry/intact and flat. RELAVENT LABS (within last 72 hrs):    Cbc, chem profile      CONDITION AT DISCHARGE:   Neurologically stable, ambulating, taking PO medications. Current Discharge Medication List      START taking these medications    Details   oxyCODONE-acetaminophen (PERCOCET) 5-325 mg per tablet Take 1 Tab by mouth every four (4) hours as needed for Pain. Max Daily Amount: 6 Tabs. Qty: 30 Tab, Refills: 0    Associated Diagnoses: Cervical spondylosis with myelopathy         CONTINUE these medications which have NOT CHANGED    Details   ibuprofen (MOTRIN PO) Take 400 mg by mouth as needed. cholecalciferol (VITAMIN D3) 1,000 unit tablet Take  by mouth daily. krill-om-3-dha-epa-phospho-ast (MEGARED OMEGA-3 KRILL OIL) 1,000-230-60 mg cap Take  by mouth daily. cetirizine (ZYRTEC) 10 mg tablet Take  by mouth daily. Indications: Allergic Rhinitis      amLODIPine (NORVASC) 10 mg tablet Take 10 mg by mouth daily. propranolol LA (INDERAL LA) 120 mg SR capsule Take 120 mg by mouth daily.  Indications: hypertension             DISPOSITION:  Home   -F/u in 3 weeks (the patient should call [phone] for the appointment)   -Activity instructions have been given in the office and by nursing.     CONSULTANTS:  [unfilled]        PCP:  Tana Fontaine MD, MD    DISCHARGING PHYSICIAN: Efrem Pacheco MD, MD

## 2018-05-01 NOTE — PROGRESS NOTES
Problem: Mobility Impaired (Adult and Pediatric)  Goal: *Acute Goals and Plan of Care (Insert Text)  In 1-7 days pt will be able to perform:  STG  1. Bed mobility:  Demonstrate proper log-roll technique for safe initiation of rolling for OOB activities. 2.  Supine to sit to supine S with HR for meals. 3.  Sit to stand to sit S with cervical collar in prep for ambulation. LT.  Gait:  Ambulate 150ft S with cervical collar, for home/community mobility. 2.  Stair Negotiation:  Ascend/descend >2 steps CGA with HR for home entry. 3.  Activity tolerance: Tolerate up in chair 30 minutes-1 hour for ADLs. 4.  Patient/Family Education:  Patient/family to be independent with HEP for follow-up care and safe discharge. Outcome: Resolved/Met Date Met: 18  physical Therapy TREATMENT/DISCHARGE    Patient: Lillian Martinez (66 y.o. male)  Date: 2018  Diagnosis: CERVICAL SPONDYLOSIS  Cervical spondylosis with myelopathy  S/P cervical spinal fusion  Cervical spondylosis with myelopathy  Cervical spondylosis with myelopathy <principal problem not specified>  Procedure(s) (LRB):  ANTERIOR CERVICAL DISKECTOMY AND FUSION (C3-C4,C4-C5) BONE AND PLATING W/C-ARM (N/A) 1 Day Post-Op  Precautions: Fall, Spinal  Chart, physical therapy assessment, plan of care and goals were reviewed. ASSESSMENT:  Pt seen in bathroom prior to session w/ cervical brace donned. Pt reports 6/10 pain but reports already receiving pain meds at this time. Pt has met all mobility goals at this time. Pt able to perform bed mobility and transfers at this time w/o any difficulty and min VCing. Pt educated on spinal precautions and pt reported she understood. Pt able to ambulate 200 ft w/ GB/ cervical brace w/ no signs of LOB at this time. Pt able to performs step negotiation using w/ R HR w/ no signs of LOB at this time. Pt transferred back to room after session, sitting on the EOB, call bell and tray in reach, nurse notified after session.  Pt has met all goals at this time D/C from acute PT. Progression toward goals:  [x]      Goals met  []      Improving appropriately and progressing toward goals  []      Improving slowly and progressing toward goals  []      Not making progress toward goals and plan of care will be adjusted     PLAN:  Patient will be discharged from physical therapy at this time. Rationale for discharge:  [x] Goals Achieved  [] [address]  [] Patient not participating in therapy  [] Other:  Discharge Recommendations:  Home Health  Further Equipment Recommendations for Discharge:  N/A     SUBJECTIVE:   Patient stated I'm ready to go home, how do I get this needle off me?     OBJECTIVE DATA SUMMARY:   Critical Behavior:  Neurologic State: Alert, Appropriate for age  Orientation Level: Oriented X4, Appropriate for age  Cognition: Appropriate decision making, Appropriate for age attention/concentration, Appropriate safety awareness  Safety/Judgement: Awareness of environment  Functional Mobility Training:  Bed Mobility:  Rolling: Modified independent;Supervision  Supine to Sit: Modified independent;Supervision  Sit to Supine: Modified independent;Supervision  Scooting: Modified independent;Supervision  Transfers:  Sit to Stand: Modified independent;Supervision  Stand to Sit: Modified independent;Supervision  Balance:  Sitting: Intact  Standing: Intact  Ambulation/Gait Training:  Distance (ft): 200 Feet (ft)  Assistive Device: Brace/Splint;Gait belt  Ambulation - Level of Assistance: Supervision  Gait Abnormalities: Decreased step clearance  Base of Support: Narrowed  Step Length: Left shortened;Right shortened  Interventions: Safety awareness training; Tactile cues; Verbal cues  Stairs:  Number of Stairs Trained: 10  Stairs - Level of Assistance: Supervision   Rail Use: Right   Therapeutic Exercises:   Pt educated on B/L LE therex activity (LAQs, seated marches, and ankle pumps) pt reported he understood  Pain:  Pain Scale 1: Numeric (0 - 10)  Pain Intensity 1: 8  Pain Location 1: Neck  Pain Orientation 1: Posterior  Pain Description 1: Dull  Pain Intervention(s) 1: Encouraged PCA  Activity Tolerance:   Good  Please refer to the flowsheet for vital signs taken during this treatment.   After treatment:   [] Patient left in no apparent distress sitting up in chair  [x] Patient left in no apparent distress in bed  [x] Call bell left within reach  [x] Nursing notified  [x] Caregiver present (Spouse)  [] Bed alarm activated  Seth Whitlock PT   Time Calculation: 9 mins

## 2018-05-01 NOTE — PROGRESS NOTES
Problem: Falls - Risk of  Goal: *Absence of Falls  Document Ranjith Fall Risk and appropriate interventions in the flowsheet.    Outcome: Progressing Towards Goal  Fall Risk Interventions:  Mobility Interventions: Patient to call before getting OOB, Utilize walker, cane, or other assitive device    Mentation Interventions: Adequate sleep, hydration, pain control    Medication Interventions: Patient to call before getting OOB, Teach patient to arise slowly    Elimination Interventions: Call light in reach, Patient to call for help with toileting needs, Toileting schedule/hourly rounds, Urinal in reach

## 2018-05-01 NOTE — DISCHARGE SUMMARY
[address] [place] [address] Kate Paez.  MR#: [id number]  : 1960  ACCOUNT #: [de-identified]   ADMIT DATE: 2018  DISCHARGE DATE:     PREOPERATIVE DIAGNOSIS:  Cervical spondylosis at C3-4 and C4-5 with cervical myelopathy. POSTOPERATIVE DIAGNOSIS:  Cervical spondylosis at C3-4 and C4-5 with cervical myelopathy. PROCEDURES PERFORMED  1. Anterior cervical diskectomy at C3-4, C4-5 interspaces. 2.  Bilateral foraminotomy at C3-4, C4-5 interspace. 3.  Allograft inserted at C3-4 and C4-5 for cervical arthrodesis. 4.  Anterior plating of cervical spine, C3, C4, and C5 with a Globus Fanshawe plate, all with microsurgical technique. SURGEON:  Tammy Beckford MD.    ANESTHESIA:  General, Dr. Shravan Dale. ESTIMATED BLOOD LOSS:   Approximately 30. COUNTS:  All counts were correct. DRAINS:  1. SPECIMENS REMOVED: none      INDICATIONS FOR PROCEDURE:  The patient was brought to surgery with numbness in both upper extremities, radicular pattern, involving the legs as well. MRI revealed multilevel disease, high grade stenosis, with cord compression at C3-C4 especially and also C4-C5 with increased signal in spinal cord at both levels, all documented in the record. Before surgery, I had a detailed discussion with the patient regarding operation, plan, indications, risks of surgeries, reasonable expectations of surgery and he desired to proceed because of the intractable nature of the symptoms and very abnormal MRI scan, fully advised to the patient. OPERATION:  The patient was placed on the operating table in the supine position. General endotracheal anesthesia inducted without difficulty. The neck was prepped and draped in sterile field. The patient given intravenous antibiotics  prepped and draped in sterile field. After we verified the anatomy, an incision made in the crease to the right of the midline.   Sharp dissection was carried out in the superior structure with blunt dissection, deeper structures, carefully retracting carotid sheath laterally and esophagus and trachea medially. The prevertebral fascia was opened and the anatomy of the spine was identified by fluoroscopy. I then dissected the longus colli muscle from the spine, placed anterior retractors in good position with good exposure of the body of C3 to lower body of C5. Starting at C4-C5 and documented by x-ray, drilled rectangular opening in the disk space, drilled all the way down to the final cartilaginous endplate, removed bone all across the midline, performed bilateral foraminotomies. When I finished, I could pass the nerve hook out the neural foramen on both sides, vertebral bodies were nicely independent of each other. We felt that appropriate compressive pathology had been found as it was described. No spinal fluid seen. I went to C3-4. Again, used the x-ray to confirm the anatomy and drilled a rectangular opening again all the way down through the cartilaginous endplate all the way down to the bottom. Disk disease at this level was more severe. I removed bone across the midline where I again performed foraminotomies with a drill and 1 mm Kerrison. When I finished again, the nerve hook could be passed out the neural foramen on both sides. Bone removed all the way across the midline. We felt appropriate compressive pathology had been found as it was described. No spinal fluid seen. The wound was irrigated with antibiotic solution. Gelfoam placed and was put in nerve root, at the dura of both levels. I have sized the correct size allograft, inserted allograft in each disk space. Good position by x-ray. I prepared the cervical spine and fitted the correct size Proctor plate with screws in the body of C3, C4, C5. I had good bony purchase with the screws. The plating system locked down nicely. Final x-ray looked good. I irrigated the wound thoroughly.   I removed the retractors. I inspected the surrounding soft tissues of the esophagus. Everything looked good. I waited a couple of minutes. Hemostasis was excellent. I passed a TLS drain through a separate stab wound down to the decompression,  skin. The wound was closed with 2 interrupted 0 Vicryl, platysmal layer with 3-0 subcuticular suture of Monocryl, Dermabond on the skin. After the wound was closed, the drain worked nicely. Standard dressing applied. The patient was placed in a cervical collar, transferred to PACU having tolerated the procedure well.       MD RINA Lucia/MELISSA  D: 04/30/2018 12:52     T: 05/01/2018 10:15  JOB #: [id number]

## 2018-05-01 NOTE — PROGRESS NOTES
Patient was visited by [facility]. Volunteer conducted a Spiritual Care Screening and reported no needs to this . Spiritual Care literature was provided. Chaplains will continue to follow and will provide pastoral care as needed or requested. [address], M.Div.   Board Certified   [phone] - Office

## 2018-05-01 NOTE — ROUTINE PROCESS
1920 - Bedside and Verbal shift change report given to Christian Connor RN by Jocelyn Veloz RN. Report included the following information SBAR, Kardex, OR Summary, Intake/Output and MAR.

## 2018-05-01 NOTE — ROUTINE PROCESS
Dual AVS reviewed with Jose Hyman RN. All medications reviewed individually with patient. Opportunities for questions and concerns provided. Patient discharged via (mode of transport ie. Car, ambulance or air transport) car. Patient's arm band appropriately discarded.

## 2018-05-01 NOTE — ROUTINE PROCESS
Bedside and Verbal shift change report given to ERIN Cortez RN (oncoming nurse) by Haritha Browning RN (offgoing nurse). Report included the following information SBAR, Procedure Summary, Intake/Output and MAR.

## 2018-05-01 NOTE — PROGRESS NOTES
0820-Per Dr. Lavonne Fried, patient ok to be discharged and does not need to pass PT.   0830-Patient reported he had taken his own BP meds, amlodipine and propranolol, from bottles he had brought in in his luggage that he did not make this nurse aware of upon admission. Patient reported this as this nurse was performing med pass, describing each medication and its purpose to patient. 6240- Patient in bed at this time, patient declines sitting up in chair at this time. A/O x 4. IV to left AC  intact and patent. SCDS  to bilateral lower legs. 4x4 and tape dressing to anterior neck has scant drainage, changed per verbal order from Dr. Lavonne Fried and drain removed at this timeI. Patient denies numbness/tingling. Pedal and radial pulses palpable. Lungs clear. Bowel sounds active to all quadrants. Patient able to get to 1700 on the incentive spirometer. Pain 4/10. PCA stopped at this time. 1000-IV removed at this time, ADT nurse to discharge patient.

## 2018-05-01 NOTE — PROGRESS NOTES
1920 - Patient observed ambulating in the hallway with physical therapy. 1930 - Assumed care of patient at this time. Patient is alert and oriented x 4. Patient denies chest pain, shortness of breath, or numbness or tingling in the upper or lower extremities. 4 x 4 gauze dressing on the anterior neck is intact, with breakthrough serosanguinous drainage present. Sequential compression device in place on the patient bilaterally. 18g IV in the left antecubital space is patent and infusing LR @ 75mL/hr. Patient currently experiencing 5/10 pain. Reinforced the use of the PCA pump. Bed is in lowest position with the wheels locked. Siderails x 3 are up. Call bell within reach. Patient is currently resting in bed in no apparent distress. Will continue to monitor. 2100 - Head to toe assessment performed at this time. Patient has no complaints of chest pain or shortness of breath. Denies any numbness or tingling in extremities. Lungs are clear to auscultation. Bowel sounds are present in all 4 quadrants. 18g IV in the left antecubital space is infusing with no signs of phlebitis or infiltration. Patient educated how to  actively manage their pain. Patient encouraged to use the incentive spirometer. Patient educated on the side effects of medications. Explained the importance of ambulation. Instructed the patient not to attempt to get out of bed and/or ambulate without a staff member present. Patient verbalized understanding. Patient left in bed with call light within reach, bed in lowest position with wheels locked, and siderails x 3 up. Will continue to monitor. 2230 - Changed dressing on anterior neck. Dressing over 50% saturated with serosanguinous drainage from around the TLS drain site. Placed new 4x4 gauze with a transparent. Will continue to monitor. 0130 - Patient resting quietly, watching television.   Dressing will need to be changed again due to saturation with serosanguinous drainage. Will continue to monitor. 0500 - Patient resting quietly watching television. Dressing on anterior neck changed at this time. There continues to be serosanguinous drainage from around the drain site. Will continue to monitor.

## 2018-05-01 NOTE — PROGRESS NOTES
Transition of Care ([facility]) Plan:     Chart reviewed, met with pt in room. Pt fiance in room, will be assisting pt as needed once home. Pt has no DME needs, will follow up with MD as scheduled. CM remains available. [facility] Transportation:   How is patient being transported at discharge? Family/Friend      When? Once cleared by Therapy between 12-2pm     Is transport scheduled? N/A      Follow-up appointment and transportation:   PCP/Specialist?  See AVS for Appointment         Who is transporting to the follow-up appointment? Family/Friend      Is transport for follow up appointment scheduled? N/A    Communication plan (with patient/family): Who is being called? Patient or Next of Kin? Responsible party? Patient      What number(s) is to be used? See Facesheet      What service provider is calling for [facility] services? When are they calling? 24-48 hours following discharge    Readmission Risk? (Green/Low; Yellow/Moderate; Red/High):  Green    Care Management Interventions  PCP Verified by CM:  Yes  Transition of Care Consult (CM Consult): Discharge Planning  Discharge Durable Medical Equipment: No  Physical Therapy Consult: Yes  Occupational Therapy Consult: Yes  Current Support Network: Own Home  Confirm Follow Up Transport: Family  Plan discussed with Pt/Family/Caregiver: Yes  Freedom of Choice Offered: Yes  Discharge Location  Discharge Placement: Home with family assistance

## 2018-05-04 NOTE — OP NOTES
[id number] [place] [address]  MR#: [id number]  : 1960  ACCOUNT #: [de-identified]   DATE OF SERVICE: 2018    AMENDED REPORT: Revised work type 2018/s      PREOPERATIVE DIAGNOSIS:  Cervical spondylosis at C3-4 and C4-5 with cervical myelopathy. POSTOPERATIVE DIAGNOSIS:  Cervical spondylosis at C3-4 and C4-5 with cervical myelopathy. PROCEDURES PERFORMED  1. Anterior cervical diskectomy at C3-4, C4-5 interspaces. 2.  Bilateral foraminotomy at C3-4, C4-5 interspace. 3.  Allograft inserted at C3-4 and C4-5 for cervical arthrodesis. 4.  Anterior plating of cervical spine, C3, C4, and C5 with a Globus [place] plate, all with microsurgical technique. SURGEON:  Rena Giles MD.    ANESTHESIA:  General, Dr. Cassandra Shankar. ESTIMATED BLOOD LOSS:   Approximately 30. COUNTS:  All counts were correct. DRAINS:  1. SPECIMENS REMOVED: None. INDICATIONS FOR PROCEDURE:  The patient was brought to surgery with numbness in both upper extremities, radicular pattern, involving the legs as well. MRI revealed multilevel disease, high grade stenosis, with cord compression at C3-C4 especially and also C4-C5 with increased signal in spinal cord at both levels, all documented in the record. Before surgery, I had a detailed discussion with the patient regarding operation, plan, indications, risks of surgeries, reasonable expectations of surgery and he desired to proceed because of the intractable nature of the symptoms and very abnormal MRI scan, fully advised to the patient. OPERATION:  The patient was placed on the operating table in the supine position. General endotracheal anesthesia inducted without difficulty. The neck was prepped and draped in sterile field. The patient given intravenous antibiotics, prepped and draped in sterile field. After we verified the anatomy, an incision made in the crease to the right of the midline.   Arya Williamson dissection was carried out in the superior structure with blunt dissection, deeper structures, carefully retracting carotid sheath laterally and esophagus and trachea medially. The prevertebral fascia was opened and the anatomy of the spine was identified by fluoroscopy. I then dissected the longus colli muscle from the spine, placed anterior retractors in good position with good exposure of the body of C3 to lower body of C5. Starting at C4-C5 and documented by x-ray, drilled rectangular opening in the disk space, drilled all the way down to the final cartilaginous endplate, removed bone all across the midline, performed bilateral foraminotomies. When I finished, I could pass the nerve hook out the neural foramen on both sides, vertebral bodies were nicely independent of each other. We felt that appropriate compressive pathology had been found as it was described. No spinal fluid seen. I went to C3-4. Again, used the x-ray to confirm the anatomy and drilled a rectangular opening again all the way down through the cartilaginous endplate all the way down to the bottom. Disk disease at this level was more severe. I removed bone across the midline where I again performed foraminotomies with a drill and 1 mm [place]. When I finished again, the nerve hook could be passed out the neural foramen on both sides. Bone removed all the way across the midline. We felt appropriate compressive pathology had been found as it was described. No spinal fluid seen. The wound was irrigated with antibiotic solution. Gelfoam placed and was put in nerve root, at the dura of both levels. I have sized the correct size allograft, inserted allograft in each disk space. Good position by x-ray. I prepared the cervical spine and fitted the correct size Brooks plate with screws in the body of C3, C4, C5. I had good bony purchase with the screws. The plating system locked down nicely. Final x-ray looked good.   I irrigated the wound thoroughly. I removed the retractors. I inspected the surrounding soft tissues of the esophagus. Everything looked good. I waited a couple of minutes. Hemostasis was excellent. I passed a TLS drain through a separate stab wound down to the decompression,  skin. The wound was closed with 2 interrupted 0 Vicryl, platysmal layer with 3-0 subcuticular suture of Monocryl, Dermabond on the skin. After the wound was closed, the drain worked nicely. Standard dressing applied. The patient was placed in a cervical collar, transferred to PACU having tolerated the procedure well.       MD Mariah Quezada  D: 04/30/2018 12:52     T: 05/01/2018 10:15  JOB #: [id number]

## (undated) DEVICE — WAX SURG 2.5GM HEMSTAT BNE BEESWAX PARAFFIN ISO PALMITATE

## (undated) DEVICE — X-RAY SPONGES,12 PLY: Brand: DERMACEA

## (undated) DEVICE — 3M™ STERI-DRAPE™ INSTRUMENT POUCH 1018: Brand: STERI-DRAPE™

## (undated) DEVICE — 3M™ TEGADERM™ TRANSPARENT FILM DRESSING FRAME STYLE, 1626W, 4 IN X 4-3/4 IN (10 CM X 12 CM), 50/CT 4CT/CASE: Brand: 3M™ TEGADERM™

## (undated) DEVICE — REM POLYHESIVE ADULT PATIENT RETURN ELECTRODE: Brand: VALLEYLAB

## (undated) DEVICE — SOL IRRIGATION INJ NACL 0.9% 500ML BTL

## (undated) DEVICE — SYRINGE BLB 50CC IRRIG PLIABLE FNGR FLNG GRAD FLSK DISP

## (undated) DEVICE — SUT ETHLN 3-0 18IN PS2 BLK --

## (undated) DEVICE — PACK PROCEDURE SURG ANTR CERV DISCECTOMY

## (undated) DEVICE — CATH IV AUTOGRD ORN 14GA 45MM -- INSYTE-N

## (undated) DEVICE — (D)PREP SKN CHLRAPRP APPL 26ML -- CONVERT TO ITEM 371833

## (undated) DEVICE — SUTURE VCRL SZ 2 0 L18IN ABSRB UD L22MM OS 4 1 2 CIR REV CUT J748T

## (undated) DEVICE — SUT MONOCRYL PLUS UD 3-0 --

## (undated) DEVICE — SINGLE PORT MANIFOLD: Brand: NEPTUNE 2

## (undated) DEVICE — SYR LR LCK 1ML GRAD NSAF 30ML --

## (undated) DEVICE — INTENDED FOR TISSUE SEPARATION, AND OTHER PROCEDURES THAT REQUIRE A SHARP SURGICAL BLADE TO PUNCTURE OR CUT.: Brand: BARD-PARKER SAFETY BLADES SIZE 15, STERILE

## (undated) DEVICE — DRAPE,THYROID,SOFT,STERILE: Brand: MEDLINE

## (undated) DEVICE — NDL SPNE QNCKE 18GX3.5IN LF --

## (undated) DEVICE — CORD BPLR L12FT DISP

## (undated) DEVICE — LIMB HOLDER, WRIST/ANKLE: Brand: DEROYAL

## (undated) DEVICE — MAYO STAND COVER: Brand: CONVERTORS

## (undated) DEVICE — DRAPE MICSCP W54XL150IN STD OCU CVR CLEARLENS TECHNOLOGY FOR

## (undated) DEVICE — TOOL 14MH30 LEGEND 14CM 3MM: Brand: MIDAS REX ™

## (undated) DEVICE — TOOL 14MH30D LEGEND 14CM 3MM MH DIAM: Brand: MIDAS REX ™

## (undated) DEVICE — DRAPE,UTILITY,XL,4/PK,STERILE: Brand: MEDLINE

## (undated) DEVICE — KENDALL SCD EXPRESS SLEEVES, KNEE LENGTH, MEDIUM: Brand: KENDALL SCD

## (undated) DEVICE — DEVON™ KNEE AND BODY STRAP 60" X 3" (1.5 M X 7.6 CM): Brand: DEVON

## (undated) DEVICE — HEAD REST BAGEL: Brand: DEVON

## (undated) DEVICE — NEEDLE HYPO 22GA L1.5IN BLK S STL HUB POLYPR SHLD REG BVL

## (undated) DEVICE — STERILE POLYISOPRENE POWDER-FREE SURGICAL GLOVES: Brand: PROTEXIS

## (undated) DEVICE — AGENT HEMSTAT W6XL9IN OXIDIZED REGENERATED CELOS ABSRB FOR

## (undated) DEVICE — 7 FRENCH DRAIN SYSTEM, STERILE: Brand: TLS